# Patient Record
Sex: FEMALE | Race: WHITE | URBAN - METROPOLITAN AREA
[De-identification: names, ages, dates, MRNs, and addresses within clinical notes are randomized per-mention and may not be internally consistent; named-entity substitution may affect disease eponyms.]

---

## 2023-07-05 ENCOUNTER — OFFICE VISIT (OUTPATIENT)
Dept: URBAN - METROPOLITAN AREA CLINIC 92 | Facility: CLINIC | Age: 28
End: 2023-07-05
Payer: COMMERCIAL

## 2023-07-05 ENCOUNTER — WEB ENCOUNTER (OUTPATIENT)
Dept: URBAN - METROPOLITAN AREA CLINIC 92 | Facility: CLINIC | Age: 28
End: 2023-07-05

## 2023-07-05 VITALS
BODY MASS INDEX: 22.6 KG/M2 | DIASTOLIC BLOOD PRESSURE: 66 MMHG | WEIGHT: 144 LBS | HEIGHT: 67 IN | HEART RATE: 79 BPM | TEMPERATURE: 96.7 F | SYSTOLIC BLOOD PRESSURE: 103 MMHG

## 2023-07-05 DIAGNOSIS — R10.84 GENERALIZED ABDOMINAL PAIN: ICD-10-CM

## 2023-07-05 DIAGNOSIS — K21.9 GASTROESOPHAGEAL REFLUX DISEASE WITHOUT ESOPHAGITIS: ICD-10-CM

## 2023-07-05 DIAGNOSIS — R07.89 NON-CARDIAC CHEST PAIN: ICD-10-CM

## 2023-07-05 PROBLEM — 266435005: Status: ACTIVE | Noted: 2023-07-05

## 2023-07-05 PROCEDURE — 99204 OFFICE O/P NEW MOD 45 MIN: CPT

## 2023-07-05 RX ORDER — SUCRALFATE 1 G/1
1 TABLET ON AN EMPTY STOMACH TABLET ORAL TWICE A DAY
Status: ACTIVE | COMMUNITY

## 2023-07-05 RX ORDER — PANTOPRAZOLE SODIUM 40 MG/1
1 TABLET TABLET, DELAYED RELEASE ORAL ONCE A DAY
Status: ACTIVE | COMMUNITY

## 2023-07-05 RX ORDER — PANTOPRAZOLE SODIUM 40 MG/1
1 TABLET TABLET, DELAYED RELEASE ORAL ONCE A DAY
Qty: 90 TABLET | Refills: 1

## 2023-07-05 RX ORDER — SUCRALFATE 1 G/1
1 TABLET ON AN EMPTY STOMACH TABLET ORAL TWICE A DAY
Qty: 60 TABLET | Refills: 1

## 2023-07-05 NOTE — HPI-TODAY'S VISIT:
28-year-old female patient presents to the due to episode of abdominal pain and noncardiac chest pain.  States that last Wednesday after having a Celsius and coffee she had an exacerbation generalized abdominal pain that radiated to her chest.  Due to this she went to [facility] ED.  Cardiac causes of chest pain were ruled out and patient was discharged on pantoprazole 40 mg and sucralfate.  She states that since then she has been feeling somewhat better.  She has never had this pain before.  1 day before she did smoke a cigarette which she typically does not do.  She also drank alcohol.  She notes that she has not had increased stress in the last few weeks.  She also notes 1 month of burping and bloating prior to onset of issues.  Also notes occasional regurgitation.  Denies any nausea, vomiting, dysphagia, odynophagia.  She has no issues with her bowel movements she did have 1 episode of melena prior to onset of symptoms.  Labs reviewed from ED were normal. She notes that she is a social drinker typically does not use any tobacco no illicit drug use.  She uses NSAIDs as needed during menstrual cycles. No recent travel outside of country.

## 2023-08-16 ENCOUNTER — OFFICE VISIT (OUTPATIENT)
Dept: URBAN - METROPOLITAN AREA CLINIC 92 | Facility: CLINIC | Age: 28
End: 2023-08-16
Payer: COMMERCIAL

## 2023-08-16 VITALS
HEART RATE: 83 BPM | DIASTOLIC BLOOD PRESSURE: 66 MMHG | HEIGHT: 67 IN | WEIGHT: 143 LBS | BODY MASS INDEX: 22.44 KG/M2 | SYSTOLIC BLOOD PRESSURE: 102 MMHG | TEMPERATURE: 98.2 F

## 2023-08-16 DIAGNOSIS — R10.84 GENERALIZED ABDOMINAL PAIN: ICD-10-CM

## 2023-08-16 DIAGNOSIS — R07.89 NON-CARDIAC CHEST PAIN: ICD-10-CM

## 2023-08-16 DIAGNOSIS — K21.9 GASTROESOPHAGEAL REFLUX DISEASE WITHOUT ESOPHAGITIS: ICD-10-CM

## 2023-08-16 PROCEDURE — 99213 OFFICE O/P EST LOW 20 MIN: CPT

## 2023-08-16 RX ORDER — PANTOPRAZOLE SODIUM 40 MG/1
1 TABLET TABLET, DELAYED RELEASE ORAL ONCE A DAY
Qty: 90 TABLET | Refills: 1 | Status: ACTIVE | COMMUNITY

## 2023-08-16 RX ORDER — SUCRALFATE 1 G/1
1 TABLET ON AN EMPTY STOMACH TABLET ORAL TWICE A DAY
Qty: 60 TABLET | Refills: 1 | Status: ACTIVE | COMMUNITY

## 2023-08-16 RX ORDER — PANTOPRAZOLE SODIUM 40 MG/1
1 TABLET TABLET, DELAYED RELEASE ORAL ONCE A DAY
Qty: 90 TABLET | Refills: 1

## 2023-08-16 RX ORDER — SUCRALFATE 1 G/1
1 TABLET ON AN EMPTY STOMACH TABLET ORAL TWICE A DAY
Qty: 60 TABLET | Refills: 1

## 2023-08-16 NOTE — HPI-TODAY'S VISIT:
28-year-old female patient presents to the due to episode of abdominal pain and noncardiac chest pain. Originally presented 7/2023 and stated that after having a Celsius and coffee she had an exacerbation generalized abdominal pain that radiated to her chest.  Due to this she went to [facility] ED.  Cardiac causes of chest pain were ruled out and patient was discharged on pantoprazole 40 mg and sucralfate.  She states that since then she has been feeling somewhat better. Now been on this for 6 weeks and still has abd pain, gerd and non cardiac chest pain. She has never had this pain before.  1 day before she did smoke a cigarette which she typically does not do.  She also drank alcohol.  She notes that she has not had increased stress in the last few weeks.  She also notes 1 month of burping and bloating prior to onset of issues.  Also notes occasional regurgitation.  Denies any nausea, vomiting, dysphagia, odynophagia.  She has no issues with her bowel movements she did have 1 episode of melena prior to onset of symptoms.  Labs reviewed from ED were normal. She notes that she is a social drinker typically does not use any tobacco no illicit drug use.  She uses NSAIDs as needed during menstrual cycles. No recent travel outside of country.

## 2023-09-01 ENCOUNTER — CLAIMS CREATED FROM THE CLAIM WINDOW (OUTPATIENT)
Dept: URBAN - METROPOLITAN AREA CLINIC 4 | Facility: CLINIC | Age: 28
End: 2023-09-01
Payer: COMMERCIAL

## 2023-09-01 ENCOUNTER — OFFICE VISIT (OUTPATIENT)
Dept: URBAN - METROPOLITAN AREA SURGERY CENTER 16 | Facility: SURGERY CENTER | Age: 28
End: 2023-09-01
Payer: COMMERCIAL

## 2023-09-01 DIAGNOSIS — K31.89 OTHER DISEASES OF STOMACH AND DUODENUM: ICD-10-CM

## 2023-09-01 DIAGNOSIS — K29.60 ADENOPAPILLOMATOSIS GASTRICA: ICD-10-CM

## 2023-09-01 DIAGNOSIS — R10.13 ABDOMINAL DISCOMFORT, EPIGASTRIC: ICD-10-CM

## 2023-09-01 DIAGNOSIS — K31.A0 GASTRIC INTESTINAL METAPLASIA, UNSPECIFIED: ICD-10-CM

## 2023-09-01 DIAGNOSIS — R10.13 DYSPEPSIA: ICD-10-CM

## 2023-09-01 PROCEDURE — G8907 PT DOC NO EVENTS ON DISCHARG: HCPCS | Performed by: INTERNAL MEDICINE

## 2023-09-01 PROCEDURE — 00731 ANES UPR GI NDSC PX NOS: CPT | Performed by: ANESTHESIOLOGY

## 2023-09-01 PROCEDURE — 00731 ANES UPR GI NDSC PX NOS: CPT | Performed by: ANESTHESIOLOGIST ASSISTANT

## 2023-09-01 PROCEDURE — 88342 IMHCHEM/IMCYTCHM 1ST ANTB: CPT | Performed by: PATHOLOGY

## 2023-09-01 PROCEDURE — 43239 EGD BIOPSY SINGLE/MULTIPLE: CPT | Performed by: INTERNAL MEDICINE

## 2023-09-01 PROCEDURE — 88305 TISSUE EXAM BY PATHOLOGIST: CPT | Performed by: PATHOLOGY

## 2023-09-20 ENCOUNTER — OFFICE VISIT (OUTPATIENT)
Dept: URBAN - METROPOLITAN AREA CLINIC 92 | Facility: CLINIC | Age: 28
End: 2023-09-20
Payer: COMMERCIAL

## 2023-09-20 ENCOUNTER — WEB ENCOUNTER (OUTPATIENT)
Dept: URBAN - METROPOLITAN AREA CLINIC 92 | Facility: CLINIC | Age: 28
End: 2023-09-20

## 2023-09-20 VITALS
WEIGHT: 144.2 LBS | HEART RATE: 67 BPM | SYSTOLIC BLOOD PRESSURE: 111 MMHG | BODY MASS INDEX: 22.63 KG/M2 | TEMPERATURE: 97.1 F | HEIGHT: 67 IN | DIASTOLIC BLOOD PRESSURE: 81 MMHG

## 2023-09-20 DIAGNOSIS — R07.89 NON-CARDIAC CHEST PAIN: ICD-10-CM

## 2023-09-20 DIAGNOSIS — R10.84 GENERALIZED ABDOMINAL PAIN: ICD-10-CM

## 2023-09-20 DIAGNOSIS — K21.9 GASTROESOPHAGEAL REFLUX DISEASE WITHOUT ESOPHAGITIS: ICD-10-CM

## 2023-09-20 PROCEDURE — 99213 OFFICE O/P EST LOW 20 MIN: CPT

## 2023-09-20 RX ORDER — SUCRALFATE 1 G/1
1 TABLET ON AN EMPTY STOMACH TABLET ORAL TWICE A DAY
Qty: 60 TABLET | Refills: 1 | Status: ACTIVE | COMMUNITY

## 2023-09-20 RX ORDER — PANTOPRAZOLE SODIUM 40 MG/1
1 TABLET TABLET, DELAYED RELEASE ORAL ONCE A DAY
Qty: 90 TABLET | Refills: 1 | Status: ACTIVE | COMMUNITY

## 2023-09-20 NOTE — HPI-TODAY'S VISIT:
28-year-old female patient presents to the due to episode of abdominal pain and noncardiac chest pain. Originally presented 7/2023 and stated that after having a Celsius and coffee she had an exacerbation generalized abdominal pain that radiated to her chest.  Due to this she went to [facility] ED.  Cardiac causes of chest pain were ruled out and patient was discharged on pantoprazole 40 mg and sucralfate.  She states that since then she has been feeling somewhat better. Now been on this for 6 weeks and still has abd pain, gerd and non cardiac chest pain. She has never had this pain before.  1 day before she did smoke a cigarette which she typically does not do.  She also drank alcohol.  She notes that she has not had increased stress in the last few weeks.  She also notes 1 month of burping and bloating prior to onset of issues.  Also notes occasional regurgitation.  Denies any nausea, vomiting, dysphagia, odynophagia.  She has no issues with her bowel movements she did have 1 episode of melena prior to onset of symptoms.  Labs reviewed from ED were normal. She notes that she is a social drinker typically does not use any tobacco no illicit drug use.  She uses NSAIDs as needed during menstrual cycles. No recent travel outside of country. Endoscopy 9-1-2023 demonstrated chronic inactive gastritis with changes suggestive of treated H. pylori gastritis.  She has never had HP before. Sx better on ppi bid and sucralfate bid.

## 2023-10-04 ENCOUNTER — LAB OUTSIDE AN ENCOUNTER (OUTPATIENT)
Dept: URBAN - METROPOLITAN AREA CLINIC 92 | Facility: CLINIC | Age: 28
End: 2023-10-04

## 2023-10-11 LAB — H PYLORI BREATH TEST: NOT DETECTED

## 2023-11-09 ENCOUNTER — DASHBOARD ENCOUNTERS (OUTPATIENT)
Age: 28
End: 2023-11-09

## 2023-11-15 ENCOUNTER — OFFICE VISIT (OUTPATIENT)
Dept: URBAN - METROPOLITAN AREA CLINIC 92 | Facility: CLINIC | Age: 28
End: 2023-11-15

## 2023-11-15 RX ORDER — PANTOPRAZOLE SODIUM 40 MG/1
1 TABLET TABLET, DELAYED RELEASE ORAL ONCE A DAY
Qty: 90 TABLET | Refills: 1 | COMMUNITY

## 2023-11-15 RX ORDER — SUCRALFATE 1 G/1
1 TABLET ON AN EMPTY STOMACH TABLET ORAL TWICE A DAY
Qty: 60 TABLET | Refills: 1 | COMMUNITY